# Patient Record
(demographics unavailable — no encounter records)

---

## 2024-12-02 NOTE — HISTORY OF PRESENT ILLNESS
[FreeTextEntry1] : 21 year old female here for an initial consultation for a left breast mass. She has a family history of breast cancer in her maternal grandmother diagnosed at age 62.  Her mother underwent a surgical excisional breast biopsy and states she was diagnosed with atypical ductal hyperplasia for which she is on tamoxifen.  Her maternal great aunt was diagnosed with ovarian cancer in her 50s. Gyn: Dr. Esther Ayoub PCP: Dr. Kacy Clarke [facility] notable for non-insulin-dependent diabetes and autoimmune dysautonomia. She states that she noted a left grape-sized breast mass a few years ago and she underwent a breast US 1/13/2022 Bilateral ultrasound:  Right breast: No suspicious abnormalities. Left breast: At the 4:00 1 cm from the nipple palpable area of concern there is a heterogeneously echogenic mass measuring 18 x 8 x 13 mm, probably a fibroadenoma. Multiple lymph nodes in the axilla contains thickened cortices. Patient is status post recent Covid booster and this may be reactive. BI-RADS 3, Follow-up ultrasound is recommended in 3-6 months. She has not undergone any subsequent imaging. She states that she recently saw her gynecologist who also reportedly noted a right upper breast mass and possibly a right lower breast mass. She is here with her sister, Vanda who is a nurse at [employer], and her mother, Renu. 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 Last week she experienced sharp left inner breast pain which she states was a 10/10 radiating to the left nipple.  The pain lasted intermittently over this subsequent 3 days but has since resolved.

## 2024-12-02 NOTE — CONSULT LETTER
[Dear  ___] : Dear  [unfilled], [Consult Letter:] : I had the pleasure of evaluating your patient, [unfilled]. [Please see my note below.] : Please see my note below. [Sincerely,] : Sincerely, [Consult Closing:] : Thank you very much for allowing me to participate in the care of this patient.  If you have any questions, please do not hesitate to contact me. [FreeTextEntry3] : Susan M. Palleschi, MD, FACS Division of Breast Surgery Director, Breast Surgery [address] [address] (Phone) [phone] (Fax) [phone] [DrIesha  ___] : Dr. WEISS

## 2024-12-02 NOTE — ASSESSMENT
[FreeTextEntry1] : Palpable left breast mass, clinically and radiologically consistent with a probable fibroadenoma. Patient reports possible right upper breast mass and right lower breast mass but on my clinical examination, no additional masses appreciated.  1. Advise her to undergo a bilateral breast ultrasound to be done now, [place].  I discussed with her that should there be any change in the appearance or size of the left 3:00 breast mass, then a core needle biopsy may be recommended.  Alternatively, if the breast mass is stable, then continued radiologic and clinical surveillance would be appropriate.  I discussed with her the possibility of elective surgical excision. 2.  She expressed interest in potential elective implant augmentation. 3. Annual bilateral mammograms starting at age 40 4. Follow up office visit 6/2025. 5. Advised monthly self breast examinations and advised her to contact me if she has any concerns.  6.  She defers elective surgical excision of the left palpable breast mass.  She states that if she were to opt to undergo bilateral breast augmentation, she would then be interested in elective excision at the time of augmentation. 7.  I will contact her with the results of the bilateral breast ultrasound and provide further recommendations at that time.

## 2024-12-02 NOTE — PHYSICAL EXAM
[Normocephalic] : normocephalic [Atraumatic] : atraumatic [EOMI] : extra ocular movement intact [Sclera nonicteric] : sclera nonicteric [Supple] : supple [No Supraclavicular Adenopathy] : no supraclavicular adenopathy [No Cervical Adenopathy] : no cervical adenopathy [No Thyromegaly] : no thyromegaly [Clear to Auscultation Bilat] : clear to auscultation bilaterally [Normal Sinus Rhythm] : normal sinus rhythm [Normal S1, S2] : normal S1 and S2 [No Gallops] : no gallops [No Rubs] : no pericardial rub [Examined in the supine and seated position] : examined in the supine and seated position [Bra Size: ___] : Bra Size: [unfilled] [No dominant masses] : no dominant masses in right breast  [No Nipple Retraction] : no left nipple retraction [No Nipple Discharge] : no left nipple discharge [No Axillary Lymphadenopathy] : no left axillary lymphadenopathy [No Edema] : no edema [No Rashes] : no rashes [No Ulceration] : no ulceration [Asymmetrical] : asymmetrical [de-identified] : Her right breast is slightly larger than her left. [de-identified] : Palpable mass 3:00 (N3.5cm) 1.3 x 1 cm, smooth, mobile, consistent with a probable fibroadenoma.

## 2024-12-02 NOTE — HISTORY OF PRESENT ILLNESS
[FreeTextEntry1] : 21 year old female here for an initial consultation for a left breast mass. She has a family history of breast cancer in her maternal grandmother diagnosed at age 62.  Her mother underwent a surgical excisional breast biopsy and states she was diagnosed with atypical ductal hyperplasia for which she is on tamoxifen.  Her maternal great aunt was diagnosed with ovarian cancer in her 50s. Gyn: Dr. Esther Ayoub PCP: Dr. Kacy Clarke [facility] notable for non-insulin-dependent diabetes and autoimmune dysautonomia. She states that she noted a left grape-sized breast mass a few years ago and she underwent a breast US 1/13/2022 Bilateral ultrasound:  Right breast: No suspicious abnormalities. Left breast: At the 4:00 1 cm from the nipple palpable area of concern there is a heterogeneously echogenic mass measuring 18 x 8 x 13 mm, probably a fibroadenoma. Multiple lymph nodes in the axilla contains thickened cortices. Patient is status post recent Covid booster and this may be reactive. BI-RADS 3, Follow-up ultrasound is recommended in 3-6 months. She has not undergone any subsequent imaging. She states that she recently saw her gynecologist who also reportedly noted a right upper breast mass and possibly a right lower breast mass. She is here with her sister, Vanda who is a nurse at [employer], and her mother, Renu. 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 Last week she experienced sharp left inner breast pain which she states was a 10/10 radiating to the left nipple.  The pain lasted intermittently over this subsequent 3 days but has since resolved.

## 2024-12-02 NOTE — CONSULT LETTER
[Dear  ___] : Dear  [unfilled], [Consult Letter:] : I had the pleasure of evaluating your patient, [unfilled]. [Please see my note below.] : Please see my note below. [Sincerely,] : Sincerely, [Consult Closing:] : Thank you very much for allowing me to participate in the care of this patient.  If you have any questions, please do not hesitate to contact me. [DrIesha  ___] : Dr. WEISS [FreeTextEntry3] : Susan M. Palleschi, MD, FACS Division of Breast Surgery Director, Breast Surgery [address] [address] (Phone) [phone] (Fax) [phone]

## 2024-12-02 NOTE — CONSULT LETTER
[Dear  ___] : Dear  [unfilled], [Consult Letter:] : I had the pleasure of evaluating your patient, [unfilled]. [Please see my note below.] : Please see my note below. [Sincerely,] : Sincerely, [Consult Closing:] : Thank you very much for allowing me to participate in the care of this patient.  If you have any questions, please do not hesitate to contact me. [DrIesha  ___] : Dr. WEISS [FreeTextEntry3] : Susan M. Palleschi, MD, FACS Division of Breast Surgery Director, Breast Surgery [address] [address] (Phone) [phone] (Fax) [phone]

## 2024-12-02 NOTE — HISTORY OF PRESENT ILLNESS
[FreeTextEntry1] : 21 year old female here for an initial consultation for a left breast mass. She has a family history of breast cancer in her maternal grandmother diagnosed at age 62.  Her mother underwent a surgical excisional breast biopsy and states she was diagnosed with atypical ductal hyperplasia for which she is on tamoxifen.  Her maternal great aunt was diagnosed with ovarian cancer in her 50s. Gyn: Dr. Esther Ayoub PCP: Dr. Kacy Clarke [facility] notable for non-insulin-dependent diabetes and autoimmune dysautonomia. She states that she noted a left grape-sized breast mass a few years ago and she underwent a breast US 1/13/2022 Bilateral ultrasound:  Right breast: No suspicious abnormalities. Left breast: At the 4:00 1 cm from the nipple palpable area of concern there is a heterogeneously echogenic mass measuring 18 x 8 x 13 mm, probably a fibroadenoma. Multiple lymph nodes in the axilla contains thickened cortices. Patient is status post recent Covid booster and this may be reactive. BI-RADS 3, Follow-up ultrasound is recommended in 3-6 months. She has not undergone any subsequent imaging. She states that she recently saw her gynecologist who also reportedly noted a right upper breast mass and possibly a right lower breast mass. She is here with her sister, Vanda who is a nurse at [employer], and her mother, Renu. 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 Last week she experienced sharp left inner breast pain which she states was a 10/10 radiating to the left nipple.  The pain lasted intermittently over this subsequent 3 days but has since resolved.

## 2024-12-02 NOTE — PHYSICAL EXAM
[Normocephalic] : normocephalic [Atraumatic] : atraumatic [EOMI] : extra ocular movement intact [Sclera nonicteric] : sclera nonicteric [Supple] : supple [No Supraclavicular Adenopathy] : no supraclavicular adenopathy [No Cervical Adenopathy] : no cervical adenopathy [No Thyromegaly] : no thyromegaly [Clear to Auscultation Bilat] : clear to auscultation bilaterally [Normal Sinus Rhythm] : normal sinus rhythm [Normal S1, S2] : normal S1 and S2 [No Gallops] : no gallops [No Rubs] : no pericardial rub [Examined in the supine and seated position] : examined in the supine and seated position [Bra Size: ___] : Bra Size: [unfilled] [No dominant masses] : no dominant masses in right breast  [No Nipple Retraction] : no left nipple retraction [No Nipple Discharge] : no left nipple discharge [No Axillary Lymphadenopathy] : no left axillary lymphadenopathy [No Edema] : no edema [No Rashes] : no rashes [No Ulceration] : no ulceration [Asymmetrical] : asymmetrical [de-identified] : Her right breast is slightly larger than her left. [de-identified] : Palpable mass 3:00 (N3.5cm) 1.3 x 1 cm, smooth, mobile, consistent with a probable fibroadenoma.

## 2024-12-02 NOTE — PHYSICAL EXAM
[Normocephalic] : normocephalic [Atraumatic] : atraumatic [EOMI] : extra ocular movement intact [Sclera nonicteric] : sclera nonicteric [Supple] : supple [No Supraclavicular Adenopathy] : no supraclavicular adenopathy [No Cervical Adenopathy] : no cervical adenopathy [No Thyromegaly] : no thyromegaly [Clear to Auscultation Bilat] : clear to auscultation bilaterally [Normal Sinus Rhythm] : normal sinus rhythm [Normal S1, S2] : normal S1 and S2 [No Gallops] : no gallops [No Rubs] : no pericardial rub [Examined in the supine and seated position] : examined in the supine and seated position [Bra Size: ___] : Bra Size: [unfilled] [No dominant masses] : no dominant masses in right breast  [No Nipple Retraction] : no left nipple retraction [No Nipple Discharge] : no left nipple discharge [No Axillary Lymphadenopathy] : no left axillary lymphadenopathy [No Edema] : no edema [No Rashes] : no rashes [No Ulceration] : no ulceration [Asymmetrical] : asymmetrical [de-identified] : Her right breast is slightly larger than her left. [de-identified] : Palpable mass 3:00 (N3.5cm) 1.3 x 1 cm, smooth, mobile, consistent with a probable fibroadenoma.

## 2024-12-05 NOTE — ASSESSMENT
[FreeTextEntry1] : 21 year old F with hx of  type 1 DM, ??autoimmune dysautonomia (dx by neurologist Dr. Samuels was on  IVIG), aseptic meningitis (which she says was a complication of IVIG), migraines , eosinophilic colitis, pituitary gland tumor, mitral regurgitation   Patient presents for eval of +KELSEY Has been seeing Dr. Field for neurology and he diagnosed her with "autoimmune dysautonomia " and she was on IVIG (2022, had only 4 days  treatment then stopped due to aseptic meningitis)  Sees endocrine for type 1 DM Has seen multiple GI, has had endoscopies and colonoscopies for chronic GI symptoms  Has had chronic diarrhea for 7 years (10 BM per day  now better on motofen). Also with Abdominal pain and dysphagia Reports joint pain in her hands, knees, elbows, shoulders.  Reports intermittent subjective knee swelling. Shoulder subluxation and jaw subluxation multiple times. .  She was told by neurologist she might have EDS. Patient does have skin hyperextensibility, easy bruising, orthostatic tachycardia syndrome Reports Piezogenic papules She has left side migraines every day  She has burning in her bilateral ear turning red and hot. She also has rashes in her legs , chest face (splotchy erythema) - reports heat rash in her legs She also has Raynaud's in her hands and feet (white discoloration) Reports tachycardia and low blood pressure.  Reports was tested for POTS ;that was negative Chronic fatigue.  Chronic dry eyes . Eye puffiness Dry mouth also 2/2 to meds Has numbness/tingling in hands and feet.  Used to be on mesalamine and budesonide which both did not help  Now on motofen which has improved Gi symptoms On B12 injection.   - Will check for autoimmune serologies given joint pain, rash, +KELSEY - My suspicion is that a lot of her symptoms are from  hypermobility genetic syndrome such as EDS ( hypermobility, hyperextensibility, easy bruising, orthostatic tachycardia syndrome, Piezogenic papules) and she would benefit from eval by EDS specialist. Her sister has similar symptoms so likely genetic component  - Regarding diarrhea and GI symptoms, I recommend seeing a GI - Regarding any new neurological concerns, would defer work up to her neurologist - Rash is triggered by heat/after shower. Can see derm for further eval . Possibly heat rash and rosacea.  -Patient advised on  joint strengthening joint protection by limiting hyperextension and use of paddings and braces.   Call back with results   Total time spent in review of patient history, clinical exam, management, counseling, and plan of care:  62min

## 2024-12-05 NOTE — HISTORY OF PRESENT ILLNESS
[FreeTextEntry1] : 21 year old F with hx of  type 1 DM, ??autoimmune dysautonomia ((dx by neurologist by Dr. Samuels was on  IVIG), aseptic meningitis (which she says was a complication of IVIG), migraines , eosinophilic colitis, pituitary gland tumor, mitral regurgitation   Patient presents for eval of +KELSEY Has been seeing Dr. Field for neurology and he diagnosed her with "autoimmune dysautonomia " and she was on IVIG (2022, had only 4 days  treatment then stopped due to aseptic meningitis)  Sees endocrine for type 1 DM Has seen multiple GI, has had endoscopies and colonoscopies for chronic GI symptoms  Has had chronic diarrhea for 7 years (10 BM per day  now better on motofen). Also with Abdominal pain and dysphagia Reports joint pain in her hands, knees, elbows, shoulders.  Reports intermittent subjective knee swelling. Shoulder subluxation and jaw subluxation multiple times. .  She was told by neurologist she might have EDS. Patient does have skin hyperextensibility, easy bruising, orthostatic tachycardia syndrome Reports Piezogenic papules She has left side migraines every day  She has burning in her bilateral ear turning red and hot. She also has rashes in her legs , chest face (splotchy erythema) - reports heat rash in her legs She also has Raynaud's in her hands and feet (white discoloration) Reports tachycardia and low blood pressure.  Reports was tested for POTS ;that was negative Chronic fatigue.  Chronic dry eyes . Eye puffiness Dry mouth also 2/2 to meds Has numbness/tingling in hands and feet.  Used to be on mesalamine and budesonide which both did not help  Now on motofen which has improved Gi symptoms On B12 injection.   Patient denies fever, chills, weight loss, nasopharyngeal ulcers, chest pain, cough,  blood in stool, dysuria, hematuria,  alopecia, eye pain/redness, vision changes, myalgias, muscle weakness, ,  , Hx of DVT/PEs.     Labs 9/5/2024 Normal/negative CMP, CBC  2023 KELSEY 1: 320 Normal/negative dsDNA, RF, CCP     PMHx: As above PSHx: denies Family Hx: Paternal Grandmother has SLE, Aunt has SLE , Another aunt has Scleroderma Denies family history of rheumatologic conditions including RA, SLE, Sjogren's, Myositis, scleroderma, or vasculitis Social Hx:  Smoking Hx: denies EtOH Hx: social Drug use: denies Occupation: hygienist

## 2024-12-05 NOTE — PHYSICAL EXAM
[TextEntry] :   GENERAL: Appears in no acute distress HEENT: EOMI. No conjunctival erythema. Moist mucous membranes. No nasopharyngeal ulcers NECK: Supple, no cervical lymphadenopathy CARDIOVASCULAR: RRR. S1, S2 auscultated.  PULMONARY: Clear to auscultation b/l,  MSK: No active synovitis, swelling, erythema, or warmth. No joint tenderness to palpation. No Bouchards or Heberdens nodes No deformities. Normal ROM of neck, back, b/l upper and lower extremities. Hypermobility" Beighton score >5 SKIN: No lesions or rashes noted today. Skin hyperextensibility  No sclerodactyly, telangiectasias, calcinosis. No tendon friction rubs.  NEURO: No focal deficits. Motor strength 5/5 in major muscle groups of b/l UE and LE. Sensation to soft touch intact in major dermatomes of b/l UE and LE. PSYCH: Normal affect and thought process.

## 2025-02-27 NOTE — ASSESSMENT
[FreeTextEntry1] : IMPRESSION: #  History of abdominal pain, nausea, vomiting, diarrhea - extensive work up in past -seen several gastroenterologists   #  Eosinophilic colitis in 2021 - previously seen gastroenterologist Dr. Posada 1/2022 - improved Eos count from 3/2021 to 10/2021 - prior GI attributed symptoms to IBS as well  -  Tried Budenosine in past, mesalamine no improvement in past  -  As per allergist in 1/2022: tried a typical 6 food elimination diet trying to avoid CM, EW, PN, TN, soy, fish, shellfish, wheat and did not find it much help. -   [place] of Rifaximin for SIBO   -  EGD/colonoscopy by Dr. Posada in October 2021. The scope was endoscopically normal however pathology showed slightly increased eosinophils in the colon - 60-80 eosinophils per high-powered field in the cecum, ascending, transverse and descending.  -  EGD/colonoscopy by Dr. Posada in March 2021 which was visually normal, however showed markedly increased eosinophils with inflammation in the descending colon to the rectum (upto 100 Eos).  Biopies neg for EoE, HP, celiac.  -  EGD/Colonoscopy by Dr. Barry on 8/2017: Nml upper GI exam.  Nml TI, and colon. Biopsies taken, neg for EoE, H. pylori, celiac disease, ileitis or colitis.    - MRI abdomen and pelvis without contrast in October 2021 which was essentially normal. She had an allergic type reaction to contrast in the past.  -  MR enterography IV contrast on 9/2017:  Nml bowels -  video capsule endoscopy in July 2021 that was normal -  as per prior GI, Consideration for CT angiogram once cleared by allergist (pt allergic to contrast) to evaluate for median arcuate ligament syndrome   PLNA:  Stool test  Blood test Gastric emptying scan Discussed imaging such as CT angiogram to evaluate for median arcuate ligament syndrome as suggested to her in past - she would like to hold off May see motility specialist  Follow up after above test

## 2025-02-27 NOTE — HISTORY OF PRESENT ILLNESS
[FreeTextEntry1] : 21 yr old with history of abdominal pain, nausea, vomiting, diarrhea - followed by GI Dr. Posada for Eosinophilic colitis (EC) who presents  with complaints of ongoing abdominal pain, vomiting diarrhea - seen several other gastroenterologists.    She is present with her sister Vanda who is a nurse.   Patient says in 2017 she got "sick" and then vomited every day for 6 months - she lost a lot weight at that time, had to go hospital.  Now vomiting every now and then. Past few years has been taking pantoprazole and Zofran as needed.   Since 2017, she's had diarrhea - has several BMs a day - 10 BMs a day - still with 10 + BM a day.   Stools are mainly watery/explosive stools - has been taking motofen given by gastroenterologist Dr. Gamboa, whom she is also currently seeing.    Says her last EGD/Colonoscopy was in 2023 by Dr. Gamboa - says bx were unremarkable - she will work with out staff to get records sent over to me.   She saw another Gastroenterologist in 5/2024 - does not remember name - says only had blood work.    She also saw a motility specialist 2 yrs ago - was given calcium carbonate - was supposed to have other tests but did not because of school - her neurologist wanted scan.     She was told by the other two other gastroenterologist that her "symptoms were beyond IBS" - she is not given another diagnosis.   Says stool test showed Yersinia - she was hospitalized 8/2024.   No weight loss, no melena, hematochezia,   Seeing rheumatologist - says being worked up for Ehler's Danlos syndrome.   Join pain.  All other review of systems are negative.

## 2025-02-27 NOTE — ASSESSMENT
[FreeTextEntry1] : IMPRESSION: #  History of abdominal pain, nausea, vomiting, diarrhea - extensive work up in past -seen several gastroenterologists   #  Eosinophilic colitis in 2021 - previously seen gastroenterologist Dr. Posada 1/2022 - improved Eos count from 3/2021 to 10/2021 - prior GI attributed symptoms to IBS as well  -  Tried Budenosine in past, mesalamine no improvement in past  -  As per allergist in 1/2022: tried a typical 6 food elimination diet trying to avoid CM, EW, PN, TN, soy, fish, shellfish, wheat and did not find it much help. -   [place] of Rifaximin for SIBO   -  EGD/colonoscopy by Dr. Posada in October 2021. The scope was endoscopically normal however pathology showed slightly increased eosinophils in the colon - 60-80 eosinophils per high-powered field in the cecum, ascending, transverse and descending.  -  EGD/colonoscopy by Dr. Posada in March 2021 which was visually normal, however showed markedly increased eosinophils with inflammation in the descending colon to the rectum (upto 100 Eos).  Biopies neg for EoE, HP, celiac.  -  EGD/Colonoscopy by Dr. Barry on 8/2017: Nml upper GI exam.  Nml TI, and colon. Biopsies taken, neg for EoE, H. pylori, celiac disease, ileitis or colitis.    - MRI abdomen and pelvis without contrast in October 2021 which was essentially normal. She had an allergic type reaction to contrast in the past.  -  MR enterography IV contrast on 9/2017:  Nml bowels -  video capsule endoscopy in July 2021 that was normal -  as per prior GI, Consideration for CT angiogram once cleared by allergist (pt allergic to contrast) to evaluate for median arcuate ligament syndrome   PLNA:  Stool test  Blood test Gastric emptying scan Discussed imaging such as CT angiogram to evaluate for median arcuate ligament syndrome as suggested to her in past - she would like to hold off May see motility specialist  Follow up after above test

## 2025-02-27 NOTE — ASSESSMENT
[FreeTextEntry1] : IMPRESSION: #  History of abdominal pain, nausea, vomiting, diarrhea - extensive work up in past -seen several gastroenterologists   #  Eosinophilic colitis in 2021 - previously seen gastroenterologist Dr. Posada 1/2022 - improved Eos count from 3/2021 to 10/2021 - prior GI attributed symptoms to IBS as well  -  Tried Budenosine in past, mesalamine no improvement in past  -  As per allergist in 1/2022: tried a typical 6 food elimination diet trying to avoid CM, EW, PN, TN, soy, fish, shellfish, wheat and did not find it much help. -   [place] of Rifaximin for SIBO   -  EGD/colonoscopy by Dr. Posada in October 2021. The scope was endoscopically normal however pathology showed slightly increased eosinophils in the colon - 60-80 eosinophils per high-powered field in the cecum, ascending, transverse and descending.  -  EGD/colonoscopy by Dr. Posada in March 2021 which was visually normal, however showed markedly increased eosinophils with inflammation in the descending colon to the rectum (upto 100 Eos).  Biopies neg for EoE, HP, celiac.  -  EGD/Colonoscopy by Dr. Barry on 8/2017: Nml upper GI exam.  Nml TI, and colon. Biopsies taken, neg for EoE, H. pylori, celiac disease, ileitis or colitis.    - MRI abdomen and pelvis without contrast in October 2021 which was essentially normal. She had an allergic type reaction to contrast in the past.  -  MR enterography IV contrast on 9/2017:  Nml bowels -  video capsule endoscopy in July 2021 that was normal -  as per prior GI, Consideration for CT angiogram once cleared by allergist (pt allergic to contrast) to evaluate for median arcuate ligament syndrome   PLNA:  Stool test  Blood test Gastric emptying scan Discussed imaging such as CT angiogram to evaluate for median arcuate ligament syndrome as suggested to her in past - she would like to hold off May see motility specialist  Follow up after above test

## 2025-04-07 NOTE — PHYSICAL EXAM
[Total Score ___] : Total Score = [unfilled] [Mild] : mild [>= 5 pubertal men and women to age 50] : >= 5 pubertal men and women to age 50 [Can you now (or could you ever) place your hands flat on the floor without bending your knees?] : Patient can (or previously) place hands flat on the floor without bending their knees [Can you now (or could you ever) bend your thumb to touch your forearm?] : Patient can (or previously) bend their thumb to touch their forearm [As a child, did you amuse  your friends by contorting your body into strange shapes or could you do the splits?] : Patient, as a child, was able to contort body or perform splits [Do you consider yourself "double jointed"?] : Patient considers themselves to be "double jointed" [Unusually soft or velvety skin] : Unusually soft or velvety skin [Mild skin hyperextensibility] : Mild skin hyperextensibility [Bilateral piezogenic papules of the heel] : Bilateral piezogenic papules of the heel [Atrophic scarring involving at least two sites and without the formation of truly papyraceous and/or] : Atrophic scarring involving at least two sites and without the formation of truly papyraceous and/or hemosideric scars as seen in classical EDS [Dental crowding and high or narrow palate] : Dental crowding and high or narrow palate [Feature A Total: ___/ 12] : Feature A Total: [unfilled]/ 12 [Musculoskeletal pain in two or more limbs, recurring daily for at least 3 months] : Musculoskeletal pain in two or more limbs, recurring daily for at least 3 months [Chronic, widespread pain for 3 months] : Chronic, widespread pain for 3 months [Absence of unusual skin fragility, which should prompt consideration of other types of EDS] : 1. Absence of unusual skin fragility, which should prompt consideration of other types of EDS [Exclusion of other heritable and acquired connective tissue disorders, including autoimmune rheumatologic] : 2. Exclusion of other heritable and acquired connective tissue disorders, including autoimmune rheumatologic conditions. In patients with an acquired CTD (e.g. Lupus, Rheumatoid Arthritis, etc.), additional diagnosis of hEDS requires meeting both Features A and B of Criterion 2. Feature C of Criterion 2 (chronic pain and/or instability) cannot be counted toward a diagnosis of hEDS in this situation [Exclusion of alternative diagnoses that may also include joint hypermobility by means of hypotonia and/or] : 3. Exclusion of alternative diagnoses that may also include joint hypermobility by means of hypotonia and/or connective tissue laxity. Alternative diagnoses and diagnostic categories include, but are not limited to, neuromuscular disorders (e.g. [place] myopathy), other hereditary disorders of the connective tissue (e.g. other types of EDS, Loeys-Trixie syndrome, Marfan syndrome), and skeletal dysplasias (e.g. osteogenesis imperfecta). Exclusion of these considerations may be based upon history, physical examination, and/or molecular genetic testing, as indicated. [General Appearance - Alert] : alert [General Appearance - In No Acute Distress] : in no acute distress [Sclera] : the sclera and conjunctiva were normal [PERRL With Normal Accommodation] : pupils were equal in size, round, and reactive to light [Extraocular Movements] : extraocular movements were intact [Neck Appearance] : the appearance of the neck was normal [Neck Cervical Mass (___cm)] : no neck mass was observed [Jugular Venous Distention Increased] : there was no jugular-venous distention [Thyroid Diffuse Enlargement] : the thyroid was not enlarged [Thyroid Nodule] : there were no palpable thyroid nodules [Auscultation Breath Sounds / Voice Sounds] : lungs were clear to auscultation bilaterally [Heart Sounds] : normal S1 and S2 [Full Pulse] : the pedal pulses are present [Edema] : there was no peripheral edema [Abdomen Soft] : soft [Cervical Lymph Nodes Enlarged Posterior Bilaterally] : posterior cervical [Cervical Lymph Nodes Enlarged Anterior Bilaterally] : anterior cervical [Supraclavicular Lymph Nodes Enlarged Bilaterally] : supraclavicular [No CVA Tenderness] : no ~M costovertebral angle tenderness [No Spinal Tenderness] : no spinal tenderness [Deep Tendon Reflexes (DTR)] : deep tendon reflexes were 2+ and symmetric [Sensation] : the sensory exam was normal to light touch and pinprick [No Focal Deficits] : no focal deficits [Oriented To Time, Place, And Person] : oriented to person, place, and time [Impaired Insight] : insight and judgment were intact [Affect] : the affect was normal [Right] : Right: N [Left] : Left: N [Bilateral] : bilateral negative [] : No [de-identified] : 0.99 [FreeTextEntry8] : 89.947 [FreeTextEntry9] : 0.784 [FreeTextEntry2] : 160.02 [FreeTextEntry3] : 159 [FreeTextEntry4] : 0.99 [FreeTextEntry5] : 70.963 [FreeTextEntry6] : 89.997 [FreeTextEntry7] : 0.784 [TWNoteComboBox1] : 0 [TWNoteComboBox2] : 0 [TWNoteComboBox3] : 0 [TWNoteComboBox4] : 0 [TWNoteComboBox5] : 0 [TWNoteComboBox6] : 0 [TWNoteComboBox7] : 0 [TWNoteComboBox8] : 0 [TWNoteComboBox9] : 0 [de-identified] : 0 [de-identified] : 1 [de-identified] : 0 [de-identified] : 0 [de-identified] : 0 [de-identified] : 0 [de-identified] : 0 [FreeTextEntry1] : Soft, velvets skin with mild hyperextensibility. Atrophic scarring over R knee, scattered.

## 2025-04-07 NOTE — ASSESSMENT
[FreeTextEntry1] : 21y F with MMC including hypermobility, orthostatic hypotension and tachycardia, dysautonomia, chronic diarrhea with IBS/SIBO/GERD history - she meets hEDS diagnostic criteria Likely autoimmune disease component (+GAD65 and DM1 autoabs, early Sjogren's abs w/ severe keratoconjunctivitis sicca on cyclosporine ggts). Comorbidities - GI- most severe, diarrhea++, abdominal sxs, poorly responsive to treatments. H/o SIBO. GERD+. Menstrual on OCP currently.  - Previous MCAS w/u negative, repeated today. CTA A/P to eval for MALS, history of iodinated contrast adv reaction (NOT ALLERGY)- will give Benadryl 25mg po 60 min prior and afterwards - MSK- start hypermobile PT- referred to [facility]  Autoimmune - GAD65+, early Sjogren Ab+, eval other neurological abs- Dr. Field ordered labs, IVIG had aspect meningitis. Consider SQIG but likely not as effective. Considering LDN vs other immunosuppression HCQ+ more aggressive tx based on findings.   - HDCT drawn in office today. Likely needs auth - CTA Abdomen pelvis w/ and w/o contrast- MALS evaluation. Benadryl 25mg po 45 min prior and afterwards study - f/u labs outside Dr. Field at [facility] and labs drawn in office today including MCAS studies blood and urine - PT- hypermobile PT Rx with [facility] at [facility] given - f/u neurology Dr. Field - f/u GI, has new GI motility evaluation- recommend seeing but may not be helpful it unaware of EDS issues - consider LDN vs other immunosuppression based on autoimmune labs/testing.

## 2025-04-07 NOTE — HISTORY OF PRESENT ILLNESS
[FreeTextEntry1] : 20y F here for genetic CTD / hypermobility evaluation  Pt with many year history since age 13 of MMC including GI issues of diarrhea, IBS-like issues, motility issues, and GERD. MSK issues including hypermobility, polyarthralgia, joint swelling in knees. Suspects EDS for many years, currently sees Dr. Field and suspected autoimmune and other issues concurrently. Reportedly has  early Sjogren's positive abs, +GAD65 as well as IAAs.  Sister with similar but different issues, but +hypermobility. Reports maternal side of family, GM/Aunt, etc. History of SLE and scleroderma in family history as well    Additional co-morbid features (non-diagnostic criteria) Other disorders often associated with HSD and hEDS include  Gastrointestinal and genitourinary (including gynecologic; 50 percent of symptomatic patients): ....[X] Bowel symptoms suggestive of functional gastrointestinal disorders (constipation alternating with diarrhea, bloating, nausea, and pain) and early satiety (ie, IBS) ........[X] Bowel dysmotility, especially slow-transit constipation (gastroparesis, slow GI motility disorders). Diarrhea++ ....[X] Heavy and painful menstrual bleeding, Now on OCP Junel ....[X] esophagitis ....[X] chronic GERD [X] Disabling, persistent fatigue. Psychiatric / Psychological / Social ....[X] Anxiety, depression, and phobia (eg, fear of movement). ....[ ] ADHD/ADD ....[ ] OCD, ....[ ] ASD ....[ ] TGD ....[ ] eating disorders Dysautonomia / POTS: [X] Autonomic dysfunction (dysautonomia, POTS, etc)Possible** ....[X] Palpitations, chest pain, and near-syncope or syncope due to postural tachycardia. ....[X] Orthostatic symptoms, including (near) blackouts due to postural hypotension. ....[X] Skin color change, [X] abnormal sweating. MCAS / Histamine Intolerance / Excessive Allergic Responses: ....[ ] Allergy / MCAS symptoms: severe allergies, sensitivities including rashes, anaphylaxis, angioedema, urticaria, or atopy affecting GI, lungs, skin; elevated tryptase levels (> 1.2 x baseline + 2 ng/mL). +Seasonal environmental allergies. Random urticarial episodes infrequently. [X] Eczema, [X] ?RAD in past Neurological symptoms: ....[X] paresthesia,feet>hands ....[ ] muscle weakness, ....[X] compressive neuropathies (ie. CTS, radiculopathy) - L ulnar distribution ....[ ] chronic HA/migraines, ....[ ] head pressure pain, heavy head, ....[ ] tremor, seizure disorders, movement disorders, tics ....[X] reduced proprioception, Skin issues: ....[X] Easy bruising ....[X] Delayed wound healing ....[ ] Skin tears Other MSK: ....[X] jaw pain, locking, TMJ ....[X] Frequent sprains, ankle rolls, injuries ....[ ] Meniscal tear, RTC tear, labrum tear, tendon [id number]. +Patellar maltracking, anna  Marfan's Systemic Criteria [ ] Wrist ([place]) and Thumb ([place]) Sign: Positive if both signs are present (3 points). [ ] Wrist (Walker) OR Thumb ([place]) Sign (1 point) [ ] Pectus Carinatum Deformity: 2 points. [ ] Pectus Excavatum or Chest Asymmetry: 1 point. [ ] Reduced Upper Segment to Lower Segment Ratio AND increased Arm Span to Height Ratio: 1 point. [X] Scoliosis OR Thoracolumbar Kyphosis: 1 point. [ ] Reduced Elbow Extension: 1 point [ ] Medial Displacement of the Medial Malleolus causing Pes Planus (Flat Feet): 1 point. [ ] Hindfoot Deformity (valgus in combination with forefoot abduction and lowering of the midfoot) 2 points [ ] Spontaneous PTX [ ] Protrusio Acetabuli: 2 points. [ ] Dural Ectasia: 2 points. [ ] Myopia greater than 3 diopters: 1 point. [ ] 3 of 5 Facial features: 3/5 typical facial characteristics including [ ] dolichocephaly, [ ] downward slanting palpebral fissures, [ ] enophthalmos, [ ] retrognathia and [ ] malar hypoplasia. 1 point [ ] Skin Striae. 1 point. [ ] Mitral Valve Prolapse: 1 point. [ ] Aortic Root Dilation with Z-score >= 2 (major feature, not part of scoring for points)   [de-identified] :   All other ROS negative except as mentioned in HPI.